# Patient Record
Sex: MALE | Race: OTHER | HISPANIC OR LATINO | Employment: FULL TIME | URBAN - METROPOLITAN AREA
[De-identification: names, ages, dates, MRNs, and addresses within clinical notes are randomized per-mention and may not be internally consistent; named-entity substitution may affect disease eponyms.]

---

## 2021-11-28 ENCOUNTER — OFFICE VISIT (OUTPATIENT)
Dept: URGENT CARE | Facility: CLINIC | Age: 41
End: 2021-11-28
Payer: COMMERCIAL

## 2021-11-28 VITALS
HEART RATE: 110 BPM | DIASTOLIC BLOOD PRESSURE: 84 MMHG | HEIGHT: 69 IN | SYSTOLIC BLOOD PRESSURE: 120 MMHG | OXYGEN SATURATION: 97 % | RESPIRATION RATE: 16 BRPM | TEMPERATURE: 98 F | BODY MASS INDEX: 26.07 KG/M2 | WEIGHT: 176 LBS

## 2021-11-28 DIAGNOSIS — T30.0 BURN: ICD-10-CM

## 2021-11-28 DIAGNOSIS — L03.011 PARONYCHIA OF FINGER OF RIGHT HAND: ICD-10-CM

## 2021-11-28 PROCEDURE — 10060 I&D ABSCESS SIMPLE/SINGLE: CPT | Mod: F5 | Performed by: PHYSICIAN ASSISTANT

## 2021-11-28 RX ORDER — SULFAMETHOXAZOLE AND TRIMETHOPRIM 800; 160 MG/1; MG/1
1 TABLET ORAL EVERY 12 HOURS
Qty: 6 TABLET | Refills: 0 | Status: SHIPPED | OUTPATIENT
Start: 2021-11-28 | End: 2021-12-01

## 2021-11-28 ASSESSMENT — ENCOUNTER SYMPTOMS
FEVER: 0
CHILLS: 0
VOMITING: 0
NAUSEA: 0
BURN: 1

## 2021-11-29 NOTE — PROGRESS NOTES
"  Subjective:     Souleymane Bethea  is a 41 y.o. male who presents for Burn (x1week, from glue gun, now a blister, possible infection.)      Burn  This is a new problem. The current episode started in the past 7 days. Pertinent negatives include no chills, fever, nausea or vomiting.   Patient presents urgent care around 1 week status post injury to right hand thumb.  He states he was using a glue gun and some of the glue landed across his thumb as well as adjacent to his thumbnail.  He has had some redness and progressive pain over the interim.  Notes swelling and pus filled blister at the base of finger nail over the last 2 to 3 days.  Denies fevers chills or whole body symptoms.  Denies redness tracking up arm.  Denies past medical history of similar.  Patient is right-hand dominant.  He did try some topical antibiotic ointment.    Review of Systems   Constitutional: Negative for chills and fever.   Gastrointestinal: Negative for nausea and vomiting.   Musculoskeletal:        Right thumb, swelling pus filled blister       Medications:    • This patient does not have an active medication from one of the medication groupers.    Allergies: Ibuprofen    Problem List: Souleymane Bethea does not have a problem list on file.    Surgical History:  No past surgical history on file.    Past Social Hx: Souleymane Bethea  reports that he has never smoked. He has never used smokeless tobacco.     Past Family Hx:  Souleymane Bethea family history is not on file.     Problem list, medications, and allergies reviewed by myself today in Epic.     Objective:   /84   Pulse (!) 110   Temp 36.7 °C (98 °F) (Temporal)   Resp 16   Ht 1.753 m (5' 9\")   Wt 79.8 kg (176 lb)   SpO2 97%   BMI 25.99 kg/m²     Physical Exam  Vitals and nursing note reviewed.   Constitutional:       General: He is not in acute distress.     Appearance: He is well-developed. He is not diaphoretic.   HENT:      Head: Normocephalic and atraumatic.      " Right Ear: External ear normal.      Left Ear: External ear normal.      Nose: Nose normal.   Eyes:      General: No scleral icterus.        Right eye: No discharge.         Left eye: No discharge.      Conjunctiva/sclera: Conjunctivae normal.   Pulmonary:      Effort: Pulmonary effort is normal. No respiratory distress.   Musculoskeletal:         General: Normal range of motion.        Hands:       Cervical back: Neck supple.      Comments: Right thumb, proximal nail fold, 1 cm diameter, raised, fluctuant, purulent cyst   Skin:     General: Skin is warm and dry.      Coloration: Skin is not pale.   Neurological:      Mental Status: He is alert and oriented to person, place, and time.      Coordination: Coordination normal.       Procedure: I & D  Paronychia     Risks and benefits discussed at length  Anesthesia: Ice held with compression over thumb  Clean technique using sterile instruments  #11 blade was advanced at the nail border until purulent material expressed.   Topical antibiotic and dressing applied.  Patient tolerated the  procedure well.      Assessment/Plan:   Assessment      1. Paronychia of finger of right hand  - sulfamethoxazole-trimethoprim (BACTRIM DS) 800-160 MG tablet; Take 1 Tablet by mouth every 12 hours for 3 days.  Dispense: 6 Tablet; Refill: 0    2. Burn  Supportive care is reviewed with patient/caregiver - recommend to push PO fluids and electrolytes,  take full course of Rx, take with probiotics, observe for resolution   Return to clinic with lack of resolution or progression of symptoms.  Epsom salt soaks    I have worn an N95 mask, gloves and eye protection for the entire encounter with this patient.     Differential diagnosis, natural history, supportive care, and indications for immediate follow-up discussed.

## 2022-03-09 ENCOUNTER — PRE-ADMISSION TESTING (OUTPATIENT)
Dept: ADMISSIONS | Facility: MEDICAL CENTER | Age: 42
End: 2022-03-09
Attending: SURGERY
Payer: COMMERCIAL

## 2022-03-09 DIAGNOSIS — Z01.812 PRE-OPERATIVE LABORATORY EXAMINATION: ICD-10-CM

## 2022-03-09 LAB
SARS-COV-2 RNA RESP QL NAA+PROBE: NOTDETECTED
SPECIMEN SOURCE: NORMAL

## 2022-03-09 PROCEDURE — U0005 INFEC AGEN DETEC AMPLI PROBE: HCPCS

## 2022-03-09 PROCEDURE — U0003 INFECTIOUS AGENT DETECTION BY NUCLEIC ACID (DNA OR RNA); SEVERE ACUTE RESPIRATORY SYNDROME CORONAVIRUS 2 (SARS-COV-2) (CORONAVIRUS DISEASE [COVID-19]), AMPLIFIED PROBE TECHNIQUE, MAKING USE OF HIGH THROUGHPUT TECHNOLOGIES AS DESCRIBED BY CMS-2020-01-R: HCPCS

## 2022-03-09 PROCEDURE — C9803 HOPD COVID-19 SPEC COLLECT: HCPCS

## 2022-03-11 ENCOUNTER — ANESTHESIA EVENT (OUTPATIENT)
Dept: SURGERY | Facility: MEDICAL CENTER | Age: 42
End: 2022-03-11
Payer: COMMERCIAL

## 2022-03-11 ENCOUNTER — ANESTHESIA (OUTPATIENT)
Dept: SURGERY | Facility: MEDICAL CENTER | Age: 42
End: 2022-03-11
Payer: COMMERCIAL

## 2022-03-11 ENCOUNTER — HOSPITAL ENCOUNTER (OUTPATIENT)
Facility: MEDICAL CENTER | Age: 42
End: 2022-03-11
Attending: SURGERY | Admitting: SURGERY
Payer: COMMERCIAL

## 2022-03-11 VITALS
OXYGEN SATURATION: 95 % | HEART RATE: 112 BPM | BODY MASS INDEX: 26.06 KG/M2 | WEIGHT: 175.93 LBS | RESPIRATION RATE: 20 BRPM | TEMPERATURE: 98.1 F | DIASTOLIC BLOOD PRESSURE: 84 MMHG | SYSTOLIC BLOOD PRESSURE: 127 MMHG | HEIGHT: 69 IN

## 2022-03-11 DIAGNOSIS — G89.18 POST-OP PAIN: ICD-10-CM

## 2022-03-11 PROCEDURE — 700102 HCHG RX REV CODE 250 W/ 637 OVERRIDE(OP): Performed by: ANESTHESIOLOGY

## 2022-03-11 PROCEDURE — 501838 HCHG SUTURE GENERAL: Performed by: SURGERY

## 2022-03-11 PROCEDURE — 160036 HCHG PACU - EA ADDL 30 MINS PHASE I: Performed by: SURGERY

## 2022-03-11 PROCEDURE — 700111 HCHG RX REV CODE 636 W/ 250 OVERRIDE (IP): Performed by: ANESTHESIOLOGY

## 2022-03-11 PROCEDURE — 160029 HCHG SURGERY MINUTES - 1ST 30 MINS LEVEL 4: Performed by: SURGERY

## 2022-03-11 PROCEDURE — 160025 RECOVERY II MINUTES (STATS): Performed by: SURGERY

## 2022-03-11 PROCEDURE — 160035 HCHG PACU - 1ST 60 MINS PHASE I: Performed by: SURGERY

## 2022-03-11 PROCEDURE — 160002 HCHG RECOVERY MINUTES (STAT): Performed by: SURGERY

## 2022-03-11 PROCEDURE — 502714 HCHG ROBOTIC SURGERY SERVICES: Performed by: SURGERY

## 2022-03-11 PROCEDURE — 700101 HCHG RX REV CODE 250: Performed by: ANESTHESIOLOGY

## 2022-03-11 PROCEDURE — 501570 HCHG TROCAR, SEPARATOR: Performed by: SURGERY

## 2022-03-11 PROCEDURE — 700105 HCHG RX REV CODE 258: Performed by: SURGERY

## 2022-03-11 PROCEDURE — C1781 MESH (IMPLANTABLE): HCPCS | Performed by: SURGERY

## 2022-03-11 PROCEDURE — A9270 NON-COVERED ITEM OR SERVICE: HCPCS | Performed by: ANESTHESIOLOGY

## 2022-03-11 PROCEDURE — 160009 HCHG ANES TIME/MIN: Performed by: SURGERY

## 2022-03-11 PROCEDURE — 160048 HCHG OR STATISTICAL LEVEL 1-5: Performed by: SURGERY

## 2022-03-11 PROCEDURE — 160041 HCHG SURGERY MINUTES - EA ADDL 1 MIN LEVEL 4: Performed by: SURGERY

## 2022-03-11 PROCEDURE — 700101 HCHG RX REV CODE 250: Performed by: SURGERY

## 2022-03-11 PROCEDURE — 160046 HCHG PACU - 1ST 60 MINS PHASE II: Performed by: SURGERY

## 2022-03-11 DEVICE — MESH PROGRIP LAPROSCOPIC SELF FIXATING (1/CA): Type: IMPLANTABLE DEVICE | Site: INGUINAL | Status: FUNCTIONAL

## 2022-03-11 RX ORDER — HYDROMORPHONE HYDROCHLORIDE 1 MG/ML
0.2 INJECTION, SOLUTION INTRAMUSCULAR; INTRAVENOUS; SUBCUTANEOUS
Status: DISCONTINUED | OUTPATIENT
Start: 2022-03-11 | End: 2022-03-11 | Stop reason: HOSPADM

## 2022-03-11 RX ORDER — ROCURONIUM BROMIDE 10 MG/ML
INJECTION, SOLUTION INTRAVENOUS PRN
Status: DISCONTINUED | OUTPATIENT
Start: 2022-03-11 | End: 2022-03-11 | Stop reason: SURG

## 2022-03-11 RX ORDER — MEPERIDINE HYDROCHLORIDE 25 MG/ML
12.5 INJECTION INTRAMUSCULAR; INTRAVENOUS; SUBCUTANEOUS
Status: DISCONTINUED | OUTPATIENT
Start: 2022-03-11 | End: 2022-03-11 | Stop reason: HOSPADM

## 2022-03-11 RX ORDER — ONDANSETRON 2 MG/ML
INJECTION INTRAMUSCULAR; INTRAVENOUS PRN
Status: DISCONTINUED | OUTPATIENT
Start: 2022-03-11 | End: 2022-03-11 | Stop reason: SURG

## 2022-03-11 RX ORDER — OXYCODONE HCL 5 MG/5 ML
5 SOLUTION, ORAL ORAL
Status: COMPLETED | OUTPATIENT
Start: 2022-03-11 | End: 2022-03-11

## 2022-03-11 RX ORDER — MIDAZOLAM HYDROCHLORIDE 1 MG/ML
INJECTION INTRAMUSCULAR; INTRAVENOUS PRN
Status: DISCONTINUED | OUTPATIENT
Start: 2022-03-11 | End: 2022-03-11 | Stop reason: SURG

## 2022-03-11 RX ORDER — HALOPERIDOL 5 MG/ML
1 INJECTION INTRAMUSCULAR
Status: DISCONTINUED | OUTPATIENT
Start: 2022-03-11 | End: 2022-03-11 | Stop reason: HOSPADM

## 2022-03-11 RX ORDER — SODIUM CHLORIDE, SODIUM LACTATE, POTASSIUM CHLORIDE, CALCIUM CHLORIDE 600; 310; 30; 20 MG/100ML; MG/100ML; MG/100ML; MG/100ML
INJECTION, SOLUTION INTRAVENOUS CONTINUOUS
Status: ACTIVE | OUTPATIENT
Start: 2022-03-11 | End: 2022-03-11

## 2022-03-11 RX ORDER — ONDANSETRON 2 MG/ML
4 INJECTION INTRAMUSCULAR; INTRAVENOUS
Status: DISCONTINUED | OUTPATIENT
Start: 2022-03-11 | End: 2022-03-11 | Stop reason: HOSPADM

## 2022-03-11 RX ORDER — METOPROLOL TARTRATE 1 MG/ML
1 INJECTION, SOLUTION INTRAVENOUS
Status: DISCONTINUED | OUTPATIENT
Start: 2022-03-11 | End: 2022-03-11 | Stop reason: HOSPADM

## 2022-03-11 RX ORDER — OXYCODONE HYDROCHLORIDE 5 MG/1
5 TABLET ORAL EVERY 6 HOURS PRN
Qty: 10 TABLET | Refills: 0 | Status: SHIPPED | OUTPATIENT
Start: 2022-03-11 | End: 2022-03-14

## 2022-03-11 RX ORDER — SODIUM CHLORIDE, SODIUM LACTATE, POTASSIUM CHLORIDE, CALCIUM CHLORIDE 600; 310; 30; 20 MG/100ML; MG/100ML; MG/100ML; MG/100ML
INJECTION, SOLUTION INTRAVENOUS CONTINUOUS
Status: DISCONTINUED | OUTPATIENT
Start: 2022-03-11 | End: 2022-03-11 | Stop reason: HOSPADM

## 2022-03-11 RX ORDER — HYDROMORPHONE HYDROCHLORIDE 1 MG/ML
0.4 INJECTION, SOLUTION INTRAMUSCULAR; INTRAVENOUS; SUBCUTANEOUS
Status: DISCONTINUED | OUTPATIENT
Start: 2022-03-11 | End: 2022-03-11 | Stop reason: HOSPADM

## 2022-03-11 RX ORDER — CEFAZOLIN SODIUM 1 G/3ML
INJECTION, POWDER, FOR SOLUTION INTRAMUSCULAR; INTRAVENOUS PRN
Status: DISCONTINUED | OUTPATIENT
Start: 2022-03-11 | End: 2022-03-11 | Stop reason: SURG

## 2022-03-11 RX ORDER — SUCCINYLCHOLINE CHLORIDE 20 MG/ML
INJECTION INTRAMUSCULAR; INTRAVENOUS PRN
Status: DISCONTINUED | OUTPATIENT
Start: 2022-03-11 | End: 2022-03-11 | Stop reason: SURG

## 2022-03-11 RX ORDER — LABETALOL HYDROCHLORIDE 5 MG/ML
5 INJECTION, SOLUTION INTRAVENOUS
Status: DISCONTINUED | OUTPATIENT
Start: 2022-03-11 | End: 2022-03-11 | Stop reason: HOSPADM

## 2022-03-11 RX ORDER — DIPHENHYDRAMINE HYDROCHLORIDE 50 MG/ML
12.5 INJECTION INTRAMUSCULAR; INTRAVENOUS
Status: DISCONTINUED | OUTPATIENT
Start: 2022-03-11 | End: 2022-03-11 | Stop reason: HOSPADM

## 2022-03-11 RX ORDER — HYDRALAZINE HYDROCHLORIDE 20 MG/ML
5 INJECTION INTRAMUSCULAR; INTRAVENOUS
Status: DISCONTINUED | OUTPATIENT
Start: 2022-03-11 | End: 2022-03-11 | Stop reason: HOSPADM

## 2022-03-11 RX ORDER — LIDOCAINE HYDROCHLORIDE 20 MG/ML
INJECTION, SOLUTION EPIDURAL; INFILTRATION; INTRACAUDAL; PERINEURAL PRN
Status: DISCONTINUED | OUTPATIENT
Start: 2022-03-11 | End: 2022-03-11 | Stop reason: SURG

## 2022-03-11 RX ORDER — HYDROMORPHONE HYDROCHLORIDE 1 MG/ML
0.1 INJECTION, SOLUTION INTRAMUSCULAR; INTRAVENOUS; SUBCUTANEOUS
Status: DISCONTINUED | OUTPATIENT
Start: 2022-03-11 | End: 2022-03-11 | Stop reason: HOSPADM

## 2022-03-11 RX ORDER — BUPIVACAINE HYDROCHLORIDE AND EPINEPHRINE 5; 5 MG/ML; UG/ML
INJECTION, SOLUTION EPIDURAL; INTRACAUDAL; PERINEURAL
Status: DISCONTINUED | OUTPATIENT
Start: 2022-03-11 | End: 2022-03-11 | Stop reason: HOSPADM

## 2022-03-11 RX ORDER — GABAPENTIN 300 MG/1
300 CAPSULE ORAL 3 TIMES DAILY
Qty: 15 CAPSULE | Refills: 0 | Status: SHIPPED | OUTPATIENT
Start: 2022-03-11 | End: 2022-03-16

## 2022-03-11 RX ORDER — OXYCODONE HCL 5 MG/5 ML
10 SOLUTION, ORAL ORAL
Status: COMPLETED | OUTPATIENT
Start: 2022-03-11 | End: 2022-03-11

## 2022-03-11 RX ORDER — DEXAMETHASONE SODIUM PHOSPHATE 4 MG/ML
INJECTION, SOLUTION INTRA-ARTICULAR; INTRALESIONAL; INTRAMUSCULAR; INTRAVENOUS; SOFT TISSUE PRN
Status: DISCONTINUED | OUTPATIENT
Start: 2022-03-11 | End: 2022-03-11 | Stop reason: SURG

## 2022-03-11 RX ORDER — MIDAZOLAM HYDROCHLORIDE 1 MG/ML
1 INJECTION INTRAMUSCULAR; INTRAVENOUS
Status: DISCONTINUED | OUTPATIENT
Start: 2022-03-11 | End: 2022-03-11 | Stop reason: HOSPADM

## 2022-03-11 RX ADMIN — SUGAMMADEX 200 MG: 100 INJECTION, SOLUTION INTRAVENOUS at 12:03

## 2022-03-11 RX ADMIN — LIDOCAINE HYDROCHLORIDE 0.5 ML: 10 INJECTION, SOLUTION EPIDURAL; INFILTRATION; INTRACAUDAL; PERINEURAL at 08:40

## 2022-03-11 RX ADMIN — SODIUM CHLORIDE, POTASSIUM CHLORIDE, SODIUM LACTATE AND CALCIUM CHLORIDE: 600; 310; 30; 20 INJECTION, SOLUTION INTRAVENOUS at 08:39

## 2022-03-11 RX ADMIN — SUCCINYLCHOLINE CHLORIDE 100 MG: 20 INJECTION, SOLUTION INTRAMUSCULAR; INTRAVENOUS; PARENTERAL at 10:59

## 2022-03-11 RX ADMIN — ONDANSETRON 4 MG: 2 INJECTION INTRAMUSCULAR; INTRAVENOUS at 10:59

## 2022-03-11 RX ADMIN — DEXAMETHASONE SODIUM PHOSPHATE 4 MG: 4 INJECTION, SOLUTION INTRA-ARTICULAR; INTRALESIONAL; INTRAMUSCULAR; INTRAVENOUS; SOFT TISSUE at 10:59

## 2022-03-11 RX ADMIN — ROCURONIUM BROMIDE 50 MG: 10 INJECTION, SOLUTION INTRAVENOUS at 10:59

## 2022-03-11 RX ADMIN — CEFAZOLIN 2 G: 330 INJECTION, POWDER, FOR SOLUTION INTRAMUSCULAR; INTRAVENOUS at 10:59

## 2022-03-11 RX ADMIN — OXYCODONE HYDROCHLORIDE 10 MG: 5 SOLUTION ORAL at 12:43

## 2022-03-11 RX ADMIN — FENTANYL CITRATE 100 MCG: 50 INJECTION, SOLUTION INTRAMUSCULAR; INTRAVENOUS at 10:59

## 2022-03-11 RX ADMIN — EPHEDRINE SULFATE 25 MG: 50 INJECTION, SOLUTION INTRAVENOUS at 10:59

## 2022-03-11 RX ADMIN — LIDOCAINE HYDROCHLORIDE 50 MG: 20 INJECTION, SOLUTION EPIDURAL; INFILTRATION; INTRACAUDAL at 10:59

## 2022-03-11 RX ADMIN — PROPOFOL 200 MG: 10 INJECTION, EMULSION INTRAVENOUS at 10:59

## 2022-03-11 RX ADMIN — MIDAZOLAM HYDROCHLORIDE 2 MG: 1 INJECTION, SOLUTION INTRAMUSCULAR; INTRAVENOUS at 10:59

## 2022-03-11 ASSESSMENT — PAIN SCALES - GENERAL: PAIN_LEVEL: 3

## 2022-03-11 NOTE — ANESTHESIA PREPROCEDURE EVALUATION
Case: [id number] Date/Time: 03/11/22 1015    Procedure: REPAIR, HERNIA, INGUINAL, ROBOT-ASSISTED, USING DA KHAI XI - MESH (Right )    Pre-op diagnosis: RIGHT INGUINAL    Location: [facility] OR 11 / SURGERY [facility]    Surgeons: Naty Mendoza M.D.          Relevant Problems   No relevant active problems       Physical Exam    Airway   Mallampati: II  TM distance: >3 FB  Neck ROM: full       Cardiovascular - normal exam  Rhythm: regular  Rate: normal  (-) murmur     Dental - normal exam           Pulmonary - normal exam  Breath sounds clear to auscultation     Abdominal    Neurological - normal exam                 Anesthesia Plan    ASA 2       Plan - general       Airway plan will be ETT          Induction: intravenous    Postoperative Plan: Postoperative administration of opioids is intended.    Pertinent diagnostic labs and testing reviewed    Informed Consent:    Anesthetic plan and risks discussed with patient.    Use of blood products discussed with: patient whom consented to blood products.

## 2022-03-11 NOTE — ANESTHESIA TIME REPORT
Anesthesia Start and Stop Event Times     Date Time Event    3/11/2022 0834 Ready for Procedure     1059 Anesthesia Start     1216 Anesthesia Stop        Responsible Staff  03/11/22    Name Role Begin End    Amadou Daniel M.D. Anesth 1059 1216        Preop Diagnosis (Free Text):  Pre-op Diagnosis     RIGHT INGUINALHERNIA        Preop Diagnosis (Codes):    Premium Reason  Non-Premium    Comments:

## 2022-03-11 NOTE — ANESTHESIA POSTPROCEDURE EVALUATION
Patient: Souleymane Box    Procedure Summary     Date: 03/11/22 Room / Location: [place] [id number] / SURGERY [facility]    Anesthesia Start: 1059 Anesthesia Stop:     Procedure: ROBOT-ASSISTED DAVINCI XI INGUINAL HERNIA REPAIR WITH MESH, RIGHT (Right Abdomen) Diagnosis: (RIGHT INGUINALHERNIA)    Surgeons: Naty Mendoza M.D. Responsible Provider: Amadou Daniel M.D.    Anesthesia Type: general ASA Status: 2          Final Anesthesia Type: general  Last vitals  BP   Blood Pressure: 131/87    Temp   36.4 °C (97.6 °F)    Pulse   (!) 104   Resp   16    SpO2   97 %      Anesthesia Post Evaluation    Patient location during evaluation: PACU  Patient participation: complete - patient participated  Level of consciousness: awake and alert  Pain score: 3    Airway patency: patent  Anesthetic complications: no  Cardiovascular status: hemodynamically stable  Respiratory status: acceptable  Hydration status: euvolemic    PONV: none          No complications documented.

## 2022-03-11 NOTE — OP REPORT
Operative Report    Date: 3/11/2022    Surgeon: Naty Mendoza M.D.     Assistant: Augustine Moreno (MS3)    Pre-operative Diagnosis: right Inguinal Hernia    Post-operative Diagnosis: Same     Procedure: Robotic right Inguinal Hernia Repair with Mesh    Indications: This is a 41 y.o. male who presented with symptoms of right Inguinal Hernia. Here for repair    Findings: Moderate sized right direct inguinal hernia; no evidence of left-sided hernia    Wound Classification: I, Clean..    Procedure in detail: The patient was seen and examined in the preoperative holding area.  The correct side for the surgery was identified with the patient awake and with their input.  This was signed.  The risks benefits and alternatives of the procedure were discussed with the patient who wished to proceed with the procedure as described.  The patient was transferred to the operating room placed in supine position and all pressure points were properly padded.  General endotracheal anesthesia was induced and preoperative antibiotics were given per SCIP protocol.  Patient's abdomen was prepped with ChloraPrep and draped in the normal sterile fashion.  A timeout was performed confirming correct patient, correct procedure, and that all necessary equipment was in the room.      We began the procedure by performing a periumbilical Optiview approach.  The area for our camera port was identified and infused with local anesthetic, local anesthetic was subsequently infused over all port sites.  The skin was sharply incised and the 5 mm port was used to gain access to the abdomen.  Pneumoperitoneum was then achieved and maintained at 15 mmHg carbon dioxide throughout the entirety of the case.  Under direct visualization a right and left working port were placed with the 8 mm robotic port.  We then exchanged our 5 mm port for an 8 mm robotic port. the robot was then docked.  We began by identifying an area approximately 8 cm from the inguinal  hernia and the peritoneum was sharply incised.  Using combination of blunt, electrocautery, and sharp dissection we were able to dissect the peritoneal flap down to the inguinal canal.  Careful dissection was completed medially to identify the pubic symphysis and carried this down to the obturator canal to achieve at least a 2cm space below the pubic symphysis and 2cm across the midline.  The corona mortise was identified and preserved.  We then continued dissection laterally until we encountered the psoas muscle ensuring that we maintain the iliopubic tract and nerves against the abdominal wall.  The dissection was complete when the entire myopectineal orifice could be visualized. A direct hernia sac was identified. Careful dissection was used to free the indirect inguinal hernia sac ensuring that the gonadal structures were carefully identified and preserved throughout the dissection of the hernia sac.  We carried our dissection down until the peritoneum was well below our area of mesh placement. Any identified cord lipoma was carefully dissected free and reduced into the preperitoneal space..     The 15 x 10 pro- hernia mesh was selected and introduced.  This was then laid into the dissected peritoneal flap ensuring good coverage medially down the pubic symphysis over the midline covering the  space as well as good overlap over the inguinal canal anatomy.  The peritoneal flap was manipulated to ensure that the mesh did not move.    The peritoneal flap was then closed with a 2-0 strata fix suture.  The perineal flap was then carefully inspected and any identified rents were closed carefully using the same 2-0 strata fix suture.  The robot was then undocked and the ports were carefully removed.  Skin was then closed with 4-0 Monocryl in a sub-particular fashion and Dermabond was placed over the wounds.    The patient was awakened from general anesthetic, and was taken to the recovery room in stable  condition.    Sponge and needle counts were correct at the end of the case.     Specimen: none    EBL: 10 cc    Dispo: stable, extubated, to PACU    Naty Mendoza M.D.  [facility]  [phone]

## 2022-03-11 NOTE — DISCHARGE INSTRUCTIONS
Robotic/Laparoscopic Surgery Discharge Instructions:    1. DIET: Upon discharge from the hospital you may resume your normal preoperative diet. Depending on how you are feeling and whether you have nausea or not, you may wish to stay with a bland diet for the first few days. However, you can advance your diet as quickly as you feel ready.    2. ACTIVITIES: You have a ten pound weight lifting restriction for four to six weeks after surgery.  This means no lifting anything heavier than a gallon of milk.  Routine activities such as walking and using the stairs are safe.  You may sleep in any position that is comfortable. Avoid strenuous activities and exercise that involves twisting, bending, and, running.    3. DRIVING: You may drive whenever you are off pain medications and are able to perform the activities needed to drive, i.e. turning, bending, twisting, wearing a seat belt, etc.    4. DRESSINGS/BATHING: You may shower two days following your surgery, but do not submerge in a bath or a pool until you after your first postoperative visit.    5. BOWEL FUNCTION: You may develop either frequent or loose stools after meals. This usually corrects itself after a few days, to a few weeks.    You may develop constipation. The combination of pain medication and decreased activity level can cause constipation in otherwise normal patients. If you feel this is occurring, increase your fluid intake and take an over the counter laxative (Milk of Magnesia, Miralax, or Magnesium Citrate).  If this is not successful, take an over the counter Fleet enema and repeat the laxative until effect.    6. PAIN MEDICATION: You may be given a prescription for pain medication at discharge. Please take these as directed. It is important to remember not to take medications on an empty stomach as this may cause nausea.  Wean the use of pain medication as soon as possible.  If narcotics were prescribed, try to avoid their use and try non-narcotic  medications, such as tylenol first.    7. CALL IF YOU HAVE: (1) Fevers of more than 101 F (38.5 C), (2) New chest or leg pain, (3) Worsening abdominal pain, (4) Persistent vomiting, (5) Large quantity bleeding, (6) Drainage from incision that is foul smelling, increased pain at the wound, wound edges that have pulled apart, redness or swelling at the incision site. Please do not hesitate to call with any other questions.     8. FOLLOW-UP APPOINTMENT: Contact my office at [phone] for a follow-up appointment in 1 to 2 weeks following your procedure.    If you have any additional questions, please do not hesitate to call the office and speak to either myself or the physician on call.    Office address:  [address] [place] [address], [address] [place] [address]      Naty Mendoza M.D.   [facility]  General Surgery  Colon and Rectal Surgeon        ACTIVITY: Rest and take it easy for the first 24 hours.  A responsible adult is recommended to remain with you during that time.  It is normal to feel sleepy.  We encourage you to not do anything that requires balance, judgment or coordination.    MILD FLU-LIKE SYMPTOMS ARE NORMAL. YOU MAY EXPERIENCE GENERALIZED MUSCLE ACHES, THROAT IRRITATION, HEADACHE AND/OR SOME NAUSEA.    FOR 24 HOURS DO NOT:  Drive, operate machinery or run household appliances.  Drink beer or alcoholic beverages.   Make important decisions or sign legal documents.    DIET: To avoid nausea, slowly advance diet as tolerated, avoiding spicy or greasy foods for the first day.  Add more substantial food to your diet according to your physician's instructions.  Babies can be fed formula or breast milk as soon as they are hungry.  INCREASE FLUIDS AND FIBER TO AVOID CONSTIPATION.    You should CALL YOUR PHYSICIAN if you develop:  Fever greater than 101 degrees F.  Pain not relieved by medication, or persistent nausea or vomiting.  Excessive bleeding (blood soaking through dressing) or unexpected drainage  from the wound.  Extreme redness or swelling around the incision site, drainage of pus or foul smelling drainage.  Inability to urinate or empty your bladder within 8 hours.  Problems with breathing or chest pain.    You should call 911 if you develop problems with breathing or chest pain.  If you are unable to contact your doctor or surgical center, you should go to the nearest emergency room or urgent care center.  Physician's telephone #: Dr. Mendoza, [phone]    If any questions arise, call your doctor.  If your doctor is not available, please feel free to call the Surgical Center at [phone].     A registered nurse may call you a few days after your surgery to see how you are doing after your procedure.    MEDICATIONS: Resume taking daily medication.  Take prescribed pain medication with food.  If no medication is prescribed, you may take non-aspirin pain medication if needed.  PAIN MEDICATION CAN BE VERY CONSTIPATING.  Take a stool softener or laxative such as senokot, pericolace, or milk of magnesia if needed.    Prescription given for oxycodone, neurontin.  Last pain medication given at 1245.    If your physician has prescribed pain medication that includes Acetaminophen (Tylenol), do not take additional Acetaminophen (Tylenol) while taking the prescribed medication.    Depression / Suicide Risk    As you are discharged from this [facility] facility, it is important to learn how to keep safe from harming yourself.    Recognize the warning signs:  · Abrupt changes in personality, positive or negative- including increase in energy   · Giving away possessions  · Change in eating patterns- significant weight changes-  positive or negative  · Change in sleeping patterns- unable to sleep or sleeping all the time   · Unwillingness or inability to communicate  · Depression  · Unusual sadness, discouragement and loneliness  · Talk of wanting to die  · Neglect of personal appearance   · Rebelliousness-  reckless behavior  · Withdrawal from people/activities they love  · Confusion- inability to concentrate     If you or a loved one observes any of these behaviors or has concerns about self-harm, here's what you can do:  · Talk about it- your feelings and reasons for harming yourself  · Remove any means that you might use to hurt yourself (examples: pills, rope, extension cords, firearm)  · Get professional help from the community (Mental Health, Substance Abuse, psychological counseling)  · Do not be alone:Call your Safe Contact- someone whom you trust who will be there for you.  · Call your local CRISIS HOTLINE 675-6165 or 734-276-4432  · Call your local Children's Mobile Crisis Response Team [place] (174) 065-5535 or www.[employer] Glint  · Call the toll free National Suicide Prevention Hotlines   · National Suicide Prevention Lifeline 663-092-AQRV (2475)  · National Hope Line Network 800-SUICIDE (568-6018)

## 2022-03-11 NOTE — OR NURSING
"Patient received from OR at 1215, bedside report received from anesthesia/OR RN, 2 patient identifiers confirmed . Patient connected to monitors, assessed for general head to toe and pain/nausea. Ordered reviewed and checked. Family updated via telephone on patient status.  At this time patient meets criteria for D/C to phase II/room.  When asked he states pain is \"tolerable\" and he is ready to get up in a chair next door and have family come back. VSS, awake and appropriate, pain minimal or at a tolerable level per patient. Report called to Liliana RN and patient to be taken to phase II.     "

## 2022-03-11 NOTE — ANESTHESIA PROCEDURE NOTES
Airway    Date/Time: 3/11/2022 10:59 AM  Performed by: Amadou Daniel M.D.  Authorized by: Amadou Daniel M.D.     Location:  OR  Urgency:  Elective  Indications for Airway Management:  Anesthesia      Spontaneous Ventilation: absent    Sedation Level:  Deep  Preoxygenated: Yes    Patient Position:  Sniffing  Final Airway Type:  Endotracheal airway  Final Endotracheal Airway:  ETT  Cuffed: Yes    Technique Used for Successful ETT Placement:  Direct laryngoscopy    Insertion Site:  Oral  Blade Type:  Waqar  Laryngoscope Blade/Videolaryngoscope Blade Size:  4  ETT Size (mm):  7.0  Measured from:  Teeth  ETT to Teeth (cm):  20  Placement Verified by: auscultation and capnometry    Cormack-Lehane Classification:  Grade I - full view of glottis  Number of Attempts at Approach:  1

## 2022-09-07 ENCOUNTER — TELEPHONE (OUTPATIENT)
Dept: SCHEDULING | Facility: IMAGING CENTER | Age: 42
End: 2022-09-07

## 2022-09-13 ENCOUNTER — OFFICE VISIT (OUTPATIENT)
Dept: MEDICAL GROUP | Facility: MEDICAL CENTER | Age: 42
End: 2022-09-13
Payer: COMMERCIAL

## 2022-09-13 ENCOUNTER — HOSPITAL ENCOUNTER (OUTPATIENT)
Dept: LAB | Facility: MEDICAL CENTER | Age: 42
End: 2022-09-13
Payer: COMMERCIAL

## 2022-09-13 VITALS
TEMPERATURE: 99.2 F | HEIGHT: 69 IN | BODY MASS INDEX: 26.02 KG/M2 | HEART RATE: 92 BPM | DIASTOLIC BLOOD PRESSURE: 84 MMHG | OXYGEN SATURATION: 96 % | WEIGHT: 175.71 LBS | SYSTOLIC BLOOD PRESSURE: 132 MMHG

## 2022-09-13 DIAGNOSIS — E66.3 OVERWEIGHT (BMI 25.0-29.9): ICD-10-CM

## 2022-09-13 DIAGNOSIS — G56.02 CARPAL TUNNEL SYNDROME OF LEFT WRIST: ICD-10-CM

## 2022-09-13 DIAGNOSIS — K21.9 GASTROESOPHAGEAL REFLUX DISEASE WITHOUT ESOPHAGITIS: ICD-10-CM

## 2022-09-13 DIAGNOSIS — R10.84 GENERALIZED ABDOMINAL PAIN: ICD-10-CM

## 2022-09-13 DIAGNOSIS — R13.10 DYSPHAGIA, UNSPECIFIED TYPE: ICD-10-CM

## 2022-09-13 PROBLEM — N20.0 KIDNEY STONE: Status: ACTIVE | Noted: 2021-12-01

## 2022-09-13 PROBLEM — K46.9 ABDOMINAL HERNIA: Status: ACTIVE | Noted: 2021-12-01

## 2022-09-13 LAB
ALBUMIN SERPL BCP-MCNC: 4.9 G/DL (ref 3.2–4.9)
ALBUMIN/GLOB SERPL: 1.6 G/DL
ALP SERPL-CCNC: 115 U/L (ref 30–99)
ALT SERPL-CCNC: 48 U/L (ref 2–50)
ANION GAP SERPL CALC-SCNC: 13 MMOL/L (ref 7–16)
AST SERPL-CCNC: 31 U/L (ref 12–45)
BILIRUB SERPL-MCNC: 0.4 MG/DL (ref 0.1–1.5)
BUN SERPL-MCNC: 18 MG/DL (ref 8–22)
CALCIUM SERPL-MCNC: 9.2 MG/DL (ref 8.5–10.5)
CHLORIDE SERPL-SCNC: 104 MMOL/L (ref 96–112)
CHOLEST SERPL-MCNC: 190 MG/DL (ref 100–199)
CO2 SERPL-SCNC: 22 MMOL/L (ref 20–33)
CREAT SERPL-MCNC: 1.1 MG/DL (ref 0.5–1.4)
ERYTHROCYTE [DISTWIDTH] IN BLOOD BY AUTOMATED COUNT: 45.5 FL (ref 35.9–50)
GFR SERPLBLD CREATININE-BSD FMLA CKD-EPI: 86 ML/MIN/1.73 M 2
GLOBULIN SER CALC-MCNC: 3 G/DL (ref 1.9–3.5)
GLUCOSE SERPL-MCNC: 95 MG/DL (ref 65–99)
HCT VFR BLD AUTO: 52.5 % (ref 42–52)
HDLC SERPL-MCNC: 42 MG/DL
HGB BLD-MCNC: 17.7 G/DL (ref 14–18)
LDLC SERPL CALC-MCNC: 122 MG/DL
MCH RBC QN AUTO: 29.9 PG (ref 27–33)
MCHC RBC AUTO-ENTMCNC: 33.7 G/DL (ref 33.7–35.3)
MCV RBC AUTO: 88.8 FL (ref 81.4–97.8)
PLATELET # BLD AUTO: 284 K/UL (ref 164–446)
PMV BLD AUTO: 10 FL (ref 9–12.9)
POTASSIUM SERPL-SCNC: 4.2 MMOL/L (ref 3.6–5.5)
PROT SERPL-MCNC: 7.9 G/DL (ref 6–8.2)
RBC # BLD AUTO: 5.91 M/UL (ref 4.7–6.1)
SODIUM SERPL-SCNC: 139 MMOL/L (ref 135–145)
TRIGL SERPL-MCNC: 129 MG/DL (ref 0–149)
TSH SERPL DL<=0.005 MIU/L-ACNC: 1.26 UIU/ML (ref 0.38–5.33)
WBC # BLD AUTO: 6.5 K/UL (ref 4.8–10.8)

## 2022-09-13 PROCEDURE — 84443 ASSAY THYROID STIM HORMONE: CPT

## 2022-09-13 PROCEDURE — 85027 COMPLETE CBC AUTOMATED: CPT

## 2022-09-13 PROCEDURE — 99214 OFFICE O/P EST MOD 30 MIN: CPT

## 2022-09-13 PROCEDURE — 80061 LIPID PANEL: CPT

## 2022-09-13 PROCEDURE — 80053 COMPREHEN METABOLIC PANEL: CPT

## 2022-09-13 PROCEDURE — 36415 COLL VENOUS BLD VENIPUNCTURE: CPT

## 2022-09-13 RX ORDER — OMEPRAZOLE 20 MG/1
20 CAPSULE, DELAYED RELEASE ORAL 2 TIMES DAILY
COMMUNITY
End: 2022-10-31

## 2022-09-13 RX ORDER — GABAPENTIN 100 MG/1
CAPSULE ORAL
COMMUNITY
End: 2022-09-13

## 2022-09-13 ASSESSMENT — PATIENT HEALTH QUESTIONNAIRE - PHQ9: CLINICAL INTERPRETATION OF PHQ2 SCORE: 0

## 2022-09-13 ASSESSMENT — ENCOUNTER SYMPTOMS
ORTHOPNEA: 0
PALPITATIONS: 0
FEVER: 0
CHILLS: 0
COUGH: 0
SHORTNESS OF BREATH: 0

## 2022-09-13 NOTE — PROGRESS NOTES
"Subjective:       HPI:   Souleymane presents today to establish care and discuss mid epigastric pain and left handed numbness and tingling.    Allergies: Ibuprofen     Medications:   Current Outpatient Medications:     omeprazole (PRILOSEC) 20 MG delayed-release capsule, Take 20 mg by mouth 2 times a day., Disp: , Rfl:       ROS:  Review of Systems   Constitutional:  Negative for chills and fever.   Respiratory:  Negative for cough and shortness of breath.    Cardiovascular:  Negative for chest pain, palpitations, orthopnea and leg swelling.     Objective:     Exam:  /84   Pulse 92   Temp 37.3 °C (99.2 °F) (Temporal)   Ht 1.753 m (5' 9\")   Wt 79.7 kg (175 lb 11.3 oz)   SpO2 96%   BMI 25.95 kg/m²  Body mass index is 25.95 kg/m².    Physical Exam  Constitutional:       Appearance: He is normal weight.   HENT:      Right Ear: Tympanic membrane normal.      Left Ear: Tympanic membrane normal.   Eyes:      Pupils: Pupils are equal, round, and reactive to light.   Cardiovascular:      Rate and Rhythm: Normal rate and regular rhythm.      Pulses: Normal pulses.      Heart sounds: Normal heart sounds.   Pulmonary:      Effort: Pulmonary effort is normal.      Breath sounds: Normal breath sounds.   Abdominal:      General: Abdomen is flat. Bowel sounds are normal.      Palpations: Abdomen is soft.      Tenderness: There is abdominal tenderness in the epigastric area and left upper quadrant.   Musculoskeletal:      Comments: Positive tinel's sign, positive Bayron's sign    Neurological:      Mental Status: He is alert and oriented to person, place, and time.   Psychiatric:         Mood and Affect: Mood normal.         Behavior: Behavior normal.       Labs ordered today: CBC, CMP, TSH, Lipid, blood glucose, barium swallow    Assessment & Plan:     42 y.o. male with the following -     1. Generalized abdominal pain  2. Dysphagia, unspecified type  Undiagnosed problem with uncertain prognosis  Patient reports that he has " "been having mid epigastric pain, and \"feels like food is getting stuck or is going to come back up\" since approximately March 2022.  He had had an inguinal hernia repaired at this time and had mentioned it to surgeon, however no further work-up was done at that time.  Patient reports that his pain is worse in the evening time and is intermittent.  - DX-CRISTYOPH. FLUORO (BARIUM SWALLOW); Future    3. Gastroesophageal reflux disease without esophagitis   Chronic, unstable  Patient reports that he has had burning and irritation in the back of his throat in the morning after he wakes up.  He states this has been going on for several months now.  Has not tried over-the-counter medicines.  Discussed with patient picking up omeprazole OTC and trying that for 2 months to see if his symptoms improve.  - omeprazole (PRILOSEC) 20 MG delayed-release capsule; Take 20 mg by mouth 2 times a day.    4. Carpal tunnel syndrome of left wrist  Chronic, unstable  Patient reports intermittent numbness and tingling that has been present for the last few weeks.  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  He has a positive Tinel's sign and positive Phalen sign on the left side.  Discussed options to use a brace, steroid injection, and surgery.  Patient would like to start with a brace and if pain does not improve he will reconsider.    5. Overweight (BMI 25.0-29.9)  Chronic, stable  Dietary and lifestyle modifications discussed with patient.  - CBC WITHOUT DIFFERENTIAL; Future  - Comp Metabolic Panel; Future  - TSH WITH REFLEX TO FT4; Future  - Lipid Profile; Future  - Blood Glucose (fasting); Future    Anticipatory guidance included the following: Patient counseled about skin care, diet, supplements, drugs/alcohol use, safe sex and exercise.     Return in about 3 months (around 12/13/2022), or if symptoms worsen or fail to improve.    Please note that this dictation was created using voice " recognition software. I have made every reasonable attempt to correct obvious errors, but I expect that there are errors of grammar and possibly content that I did not discover before finalizing the note.

## 2022-09-27 ENCOUNTER — HOSPITAL ENCOUNTER (OUTPATIENT)
Dept: RADIOLOGY | Facility: MEDICAL CENTER | Age: 42
End: 2022-09-27
Payer: COMMERCIAL

## 2022-09-27 DIAGNOSIS — R13.10 DYSPHAGIA, UNSPECIFIED TYPE: ICD-10-CM

## 2022-09-27 PROCEDURE — 74220 X-RAY XM ESOPHAGUS 1CNTRST: CPT

## 2022-10-12 ENCOUNTER — OFFICE VISIT (OUTPATIENT)
Dept: MEDICAL GROUP | Facility: MEDICAL CENTER | Age: 42
End: 2022-10-12
Payer: COMMERCIAL

## 2022-10-12 VITALS
WEIGHT: 170.86 LBS | OXYGEN SATURATION: 98 % | DIASTOLIC BLOOD PRESSURE: 60 MMHG | SYSTOLIC BLOOD PRESSURE: 114 MMHG | HEIGHT: 69 IN | BODY MASS INDEX: 25.31 KG/M2 | TEMPERATURE: 97.9 F | HEART RATE: 108 BPM

## 2022-10-12 DIAGNOSIS — R20.2 PARESTHESIA OF LEFT ARM AND LEG: ICD-10-CM

## 2022-10-12 DIAGNOSIS — K21.9 GASTROESOPHAGEAL REFLUX DISEASE WITHOUT ESOPHAGITIS: ICD-10-CM

## 2022-10-12 DIAGNOSIS — Z23 NEED FOR VACCINATION: ICD-10-CM

## 2022-10-12 PROCEDURE — 99214 OFFICE O/P EST MOD 30 MIN: CPT | Mod: 25

## 2022-10-12 PROCEDURE — 90715 TDAP VACCINE 7 YRS/> IM: CPT

## 2022-10-12 PROCEDURE — 90686 IIV4 VACC NO PRSV 0.5 ML IM: CPT

## 2022-10-12 PROCEDURE — 90471 IMMUNIZATION ADMIN: CPT

## 2022-10-12 PROCEDURE — 90472 IMMUNIZATION ADMIN EACH ADD: CPT

## 2022-10-12 ASSESSMENT — ENCOUNTER SYMPTOMS
FEVER: 0
ORTHOPNEA: 0
PALPITATIONS: 0
CHILLS: 0
SHORTNESS OF BREATH: 0
COUGH: 0

## 2022-10-12 ASSESSMENT — FIBROSIS 4 INDEX: FIB4 SCORE: 0.66

## 2022-10-12 NOTE — PROGRESS NOTES
"Subjective:     CC: Tingling (On whole left side of the body, on and off x 1 mth )      HPI:   Souleymane presents today for numbness and tingling in his left hand and leg, heartburn    Allergies: Ibuprofen     Medications:   Current Outpatient Medications:     Cyanocobalamin (B-12 PO), Take  by mouth., Disp: , Rfl:     omeprazole (PRILOSEC) 20 MG delayed-release capsule, Take 20 mg by mouth 2 times a day., Disp: , Rfl:       ROS:  Review of Systems   Constitutional:  Negative for chills and fever.   Respiratory:  Negative for cough and shortness of breath.    Cardiovascular:  Negative for chest pain, palpitations, orthopnea and leg swelling.     Objective:     Exam:  /60 (BP Location: Left arm, Patient Position: Sitting, BP Cuff Size: Adult)   Pulse (!) 108   Temp 36.6 °C (97.9 °F) (Temporal)   Ht 1.753 m (5' 9\")   Wt 77.5 kg (170 lb 13.7 oz)   SpO2 98%   BMI 25.23 kg/m²  Body mass index is 25.23 kg/m².    Physical Exam  Constitutional:       Appearance: He is normal weight.   Eyes:      General: No visual field deficit.     Pupils: Pupils are equal, round, and reactive to light.   Cardiovascular:      Rate and Rhythm: Normal rate and regular rhythm.      Pulses: Normal pulses.      Heart sounds: Normal heart sounds.   Pulmonary:      Effort: Pulmonary effort is normal.      Breath sounds: Normal breath sounds.   Neurological:      Mental Status: He is alert and oriented to person, place, and time.      Cranial Nerves: No facial asymmetry.      Sensory: No sensory deficit.      Motor: No weakness, tremor or atrophy.   Psychiatric:         Mood and Affect: Mood normal.         Behavior: Behavior normal.       Labs/ imaging ordered today: MRI brain with and without contrast, sed rate, T.pallidum, vitamin B12,  Assessment & Plan:     42 y.o. male with the following -     1. Paresthesia of left arm and leg  Undiagnosed problem with on certain prognosis  Patient reports that he has been having numbness on the " left side in his hand and leg.  He states that the numbness and tingling has been there for over a month.  He states that the numbness and tingling is progressively getting worse.  At first the numbness was intermittent and now he has noticed that it is constant.  He also reports that he noticed numbness on the back of his head more recently, in the last week.  He denies any trauma, neck pain, back pain.  Neuro exam was equal bilaterally today in office.  Patient did not show signs of having a stroke.  ER precautions discussed with patient should his symptoms progress quickly before he is able to follow-up with neurology and get imaging done.  - Referral to Neurodiagnostics (EEG,EP,EMG/NCS/DBS)  - Referral to Neurology  - VITAMIN B12; Future  - Sed Rate; Future  - T.PALLIDUM AB TOD (SCREENING); Future  - MR-BRAIN-WITH & W/O; Future  - VITAMIN D,25 HYDROXY (DEFICIENCY); Future  - CRP QUANTITIVE (NON-CARDIAC); Future    2.  Gastroesophageal reflux disease without esophagitis  Chronic, stable  Patient was having heartburn for the last several months.  Since starting omeprazole he has noticed a significant improvement in his symptoms.  Instructed the patient to continue this treatment for 1 more month and then stop the medication to see if his symptoms return.  If his acid reflux returns, then I will refer him to GI for upper endoscopy.    3. Need for vaccination  - INFLUENZA VACCINE QUAD INJ (PF)  - Tdap Vaccine =>6YO IM    Anticipatory guidance included the following: Patient counseled about skin care, diet, supplements, smoking, drugs/alcohol use, safe sex and exercise.     Return in about 2 months (around 12/12/2022).    Please note that this dictation was created using voice recognition software. I have made every reasonable attempt to correct obvious errors, but I expect that there are errors of grammar and possibly content that I did not discover before finalizing the note.

## 2022-10-20 ENCOUNTER — HOSPITAL ENCOUNTER (OUTPATIENT)
Dept: LAB | Facility: MEDICAL CENTER | Age: 42
End: 2022-10-20
Payer: COMMERCIAL

## 2022-10-20 DIAGNOSIS — R20.2 PARESTHESIA OF LEFT ARM AND LEG: ICD-10-CM

## 2022-10-20 LAB
25(OH)D3 SERPL-MCNC: 18 NG/ML (ref 30–100)
CRP SERPL HS-MCNC: <0.3 MG/DL (ref 0–0.75)
ERYTHROCYTE [SEDIMENTATION RATE] IN BLOOD BY WESTERGREN METHOD: 3 MM/HOUR (ref 0–20)
T PALLIDUM AB SER QL IA: NORMAL
VIT B12 SERPL-MCNC: 1218 PG/ML (ref 211–911)

## 2022-10-20 PROCEDURE — 82306 VITAMIN D 25 HYDROXY: CPT

## 2022-10-20 PROCEDURE — 86140 C-REACTIVE PROTEIN: CPT

## 2022-10-20 PROCEDURE — 82607 VITAMIN B-12: CPT

## 2022-10-20 PROCEDURE — 36415 COLL VENOUS BLD VENIPUNCTURE: CPT

## 2022-10-20 PROCEDURE — 86780 TREPONEMA PALLIDUM: CPT

## 2022-10-20 PROCEDURE — 85652 RBC SED RATE AUTOMATED: CPT

## 2022-10-31 ENCOUNTER — OFFICE VISIT (OUTPATIENT)
Dept: URGENT CARE | Facility: CLINIC | Age: 42
End: 2022-10-31
Payer: COMMERCIAL

## 2022-10-31 VITALS
HEART RATE: 96 BPM | BODY MASS INDEX: 25.48 KG/M2 | RESPIRATION RATE: 16 BRPM | SYSTOLIC BLOOD PRESSURE: 144 MMHG | DIASTOLIC BLOOD PRESSURE: 98 MMHG | OXYGEN SATURATION: 98 % | TEMPERATURE: 98.6 F | HEIGHT: 69 IN | WEIGHT: 172 LBS

## 2022-10-31 DIAGNOSIS — R51.9 ACUTE NONINTRACTABLE HEADACHE, UNSPECIFIED HEADACHE TYPE: ICD-10-CM

## 2022-10-31 DIAGNOSIS — R20.2 PARESTHESIA: ICD-10-CM

## 2022-10-31 PROCEDURE — 99214 OFFICE O/P EST MOD 30 MIN: CPT | Performed by: NURSE PRACTITIONER

## 2022-10-31 ASSESSMENT — ENCOUNTER SYMPTOMS
VOMITING: 0
FOCAL WEAKNESS: 0
HEADACHES: 1
WEAKNESS: 0
BLURRED VISION: 0
DIZZINESS: 0
TINGLING: 1
NAUSEA: 0
CONSTITUTIONAL NEGATIVE: 1

## 2022-10-31 ASSESSMENT — VISUAL ACUITY: OU: 1

## 2022-10-31 ASSESSMENT — FIBROSIS 4 INDEX: FIB4 SCORE: 0.66

## 2022-11-01 NOTE — PROGRESS NOTES
"Subjective:     Souleymane Box is a 42 y.o. male who presents for Headache (Headache off and on x 2 days, sharp pain L top of head, tingling L side of body 1 month ago, EMG this week, MRI 2 weeks)       Headache  Providers seen:  None    Yesterday, started to experience new onset of moderate headache at the top of his head followed by paresthesia.    Seen by PCP 10/12/2022. Had CC of left body paresthesia x 1 month. EMG and MRI brain scheduled. Referred to neurology. Labs ordered.    Denies weakness.  No vision change, nausea, or vomiting.    Review of Systems   Constitutional: Negative.    Eyes:  Negative for blurred vision.   Gastrointestinal:  Negative for nausea and vomiting.   Neurological:  Positive for tingling and headaches. Negative for dizziness, focal weakness and weakness.   All other systems reviewed and are negative.    Refer to HPI for additional details.    During this visit, appropriate PPE was worn, hand hygiene was performed, and the patient and any visitors were masked.    PMH:  has a past medical history of Heart burn, Inguinal hernia, and Kidney stone.    MEDS: No current outpatient medications on file.    ALLERGIES:   Allergies   Allergen Reactions    Ibuprofen Rash     Pt stated      SURGHX:   Past Surgical History:   Procedure Laterality Date    [place] LAP,INGUINAL HERNIA REPR,INITIAL Right 3/11/2022    Procedure: ROBOT-ASSISTED DAVINCI XI INGUINAL HERNIA REPAIR WITH MESH, RIGHT;  Surgeon: Naty Mendoza M.D.;  Location: SURGERY [facility];  Service: Gen Robotic    OTHER      tooth extractions     SOCHX:  reports that he has never smoked. He has never used smokeless tobacco. He reports current alcohol use. He reports that he does not use drugs.    FH: Per HPI as applicable/pertinent.      Objective:     BP (!) 144/98   Pulse 96   Temp 37 °C (98.6 °F) (Temporal)   Resp 16   Ht 1.753 m (5' 9\")   Wt 78 kg (172 lb)   SpO2 98%   BMI 25.40 kg/m²     Physical Exam  Nursing note " reviewed.   Constitutional:       General: He is not in acute distress.     Appearance: He is well-developed. He is not ill-appearing or toxic-appearing.   HENT:      Head: Normocephalic.   Eyes:      General: Vision grossly intact.      Extraocular Movements: Extraocular movements intact.      Pupils: Pupils are equal, round, and reactive to light.   Cardiovascular:      Rate and Rhythm: Normal rate.   Pulmonary:      Effort: Pulmonary effort is normal. No respiratory distress.   Musculoskeletal:         General: No deformity. Normal range of motion.   Skin:     General: Skin is warm and dry.      Coloration: Skin is not pale.   Neurological:      Mental Status: He is alert and oriented to person, place, and time.      GCS: GCS eye subscore is 4. GCS verbal subscore is 5. GCS motor subscore is 6.      Cranial Nerves: No dysarthria or facial asymmetry.      Motor: No weakness.      Gait: Gait normal.      Comments: Hand  strong/equal, legs equal, facial movements symmetrical   Psychiatric:         Mood and Affect: Mood normal.         Behavior: Behavior normal. Behavior is cooperative.         Thought Content: Thought content normal.         Judgment: Judgment normal.       Assessment/Plan:     1. Acute nonintractable headache, unspecified headache type  - CT-HEAD W/O; Future    2. Paresthesia  - CT-HEAD W/O; Future    CT pending to further evaluate symptoms.  Patient will schedule with [facility] as he has [employer].    May take OTC Tylenol as needed for headache.    Patient will proceed with scheduled EMG and MRI of the brain. He will follow up with neurology.    Warning signs reviewed. Strict return/ER precautions advised.    Discharge summary provided.     Differential diagnosis, natural history, supportive care, over-the-counter symptom management per 's instructions, close monitoring, and indications for immediate follow-up discussed.     All questions answered. Patient agrees with the plan of care.

## 2022-11-02 ENCOUNTER — NON-PROVIDER VISIT (OUTPATIENT)
Dept: NEUROLOGY | Facility: MEDICAL CENTER | Age: 42
End: 2022-11-02
Attending: PSYCHIATRY & NEUROLOGY
Payer: COMMERCIAL

## 2022-11-02 DIAGNOSIS — R20.2 PARESTHESIA OF LEFT ARM AND LEG: ICD-10-CM

## 2022-11-02 PROCEDURE — 95886 MUSC TEST DONE W/N TEST COMP: CPT | Performed by: PSYCHIATRY & NEUROLOGY

## 2022-11-02 PROCEDURE — 95912 NRV CNDJ TEST 11-12 STUDIES: CPT | Mod: 26 | Performed by: PSYCHIATRY & NEUROLOGY

## 2022-11-02 PROCEDURE — 95886 MUSC TEST DONE W/N TEST COMP: CPT | Mod: 26 | Performed by: PSYCHIATRY & NEUROLOGY

## 2022-11-02 PROCEDURE — 95912 NRV CNDJ TEST 11-12 STUDIES: CPT | Performed by: PSYCHIATRY & NEUROLOGY

## 2022-11-08 ENCOUNTER — HOSPITAL ENCOUNTER (EMERGENCY)
Facility: MEDICAL CENTER | Age: 42
End: 2022-11-08
Attending: EMERGENCY MEDICINE
Payer: COMMERCIAL

## 2022-11-08 VITALS
DIASTOLIC BLOOD PRESSURE: 100 MMHG | BODY MASS INDEX: 25.7 KG/M2 | HEART RATE: 98 BPM | RESPIRATION RATE: 16 BRPM | TEMPERATURE: 98.1 F | SYSTOLIC BLOOD PRESSURE: 142 MMHG | HEIGHT: 69 IN | WEIGHT: 173.5 LBS | OXYGEN SATURATION: 99 %

## 2022-11-08 DIAGNOSIS — R20.2 PARESTHESIAS: ICD-10-CM

## 2022-11-08 LAB — GLUCOSE BLD STRIP.AUTO-MCNC: 103 MG/DL (ref 65–99)

## 2022-11-08 PROCEDURE — 99282 EMERGENCY DEPT VISIT SF MDM: CPT

## 2022-11-08 PROCEDURE — 82962 GLUCOSE BLOOD TEST: CPT

## 2022-11-08 RX ORDER — ACETAMINOPHEN 500 MG
500 TABLET ORAL EVERY 6 HOURS PRN
Status: SHIPPED | COMMUNITY
End: 2022-12-15

## 2022-11-08 RX ORDER — OMEPRAZOLE 20 MG/1
20 CAPSULE, DELAYED RELEASE ORAL PRN
Status: SHIPPED | COMMUNITY
End: 2022-12-15 | Stop reason: SDUPTHER

## 2022-11-08 RX ORDER — GABAPENTIN 300 MG/1
300 CAPSULE ORAL 3 TIMES DAILY
Qty: 90 CAPSULE | Refills: 0 | Status: SHIPPED | OUTPATIENT
Start: 2022-11-08 | End: 2022-11-21 | Stop reason: SDUPTHER

## 2022-11-08 ASSESSMENT — FIBROSIS 4 INDEX: FIB4 SCORE: 0.66

## 2022-11-09 NOTE — ED NOTES
Med rec updated and complete, per pt   Allergies reviewed, per pt   Pt reports no prescription medications or vitamins.  Pt reports no antibiotics in the last 30 days.

## 2022-11-09 NOTE — ED PROVIDER NOTES
ED Provider Note    CHIEF COMPLAINT  Chief Complaint   Patient presents with    Headache     Left side of head feels pressure and burning sensation, this has been happening over the past month.  Episodes last anywhere between 2-15 minutes and go away. Reports feeling mental fogginess during episode.  No n/v/dizziness/chest pain/shortness of breath.  He reports tingling down left leg.  No trauma to area.  NIH 0        HPI  Souleymane Box is a 42 y.o. male who presents to the ED with complaints of left-sided tingling burning of his head.  Patient states that over the past 2 to 3 months has been having issues with his left side primary is left leg left arm and head having bouts of tingling.  Patient has been seen by his primary care physician he is set up to have a CT scan as well as an MRI next week CT is on Monday MRI is on Tuesday the patient has been seen for a neurologist because he has been having some tingling to his left leg and had an EMG study that was told to him as being normal.  Patient states that today he had an episode where started having a tingling to the left side of his head then it started getting a burning sensation on the top of his head so he was concerned and presented to the emergency department for evaluation.  Upon arrival here he relates the above history states the burning is resolved he does describe some tingling down his left leg.  Patient denies any other symptoms presents for evaluation.    REVIEW OF SYSTEMS  See HPI for further details. All other systems are negative.     PAST MEDICAL HISTORY  Past Medical History:   Diagnosis Date    Heart burn     Inguinal hernia     Kidney stone        FAMILY HISTORY  Family History   Problem Relation Age of Onset    No Known Problems Mother     No Known Problems Father     No Known Problems Sister     No Known Problems Brother     No Known Problems Brother        SOCIAL HISTORY  Social History     Socioeconomic History    Marital status:  "Single   Tobacco Use    Smoking status: Never    Smokeless tobacco: Never   Vaping Use    Vaping Use: Never used   Substance and Sexual Activity    Alcohol use: Yes     Comment: 1-2 per month    Drug use: Never    Sexual activity: Not Currently          SURGICAL HISTORY  Past Surgical History:   Procedure Laterality Date    [place] LAP,INGUINAL HERNIA REPR,INITIAL Right 3/11/2022    Procedure: ROBOT-ASSISTED DAVINCI XI INGUINAL HERNIA REPAIR WITH MESH, RIGHT;  Surgeon: Naty Mendoza M.D.;  Location: SURGERY [facility];  Service: Gen Robotic    OTHER      tooth extractions       CURRENT MEDICATIONS  Home Medications       Reviewed by Julio Cesar Beltran (Pharmacy Tech) on 11/08/22 at 1645  Med List Status: Complete     Medication Last Dose Status   acetaminophen (TYLENOL) 500 MG Tab 11/6/2022 Active   omeprazole (PRILOSEC) 20 MG delayed-release capsule > 10 days Active                    ALLERGIES  Allergies   Allergen Reactions    Ibuprofen Rash     .       PHYSICAL EXAM  VITAL SIGNS: BP (!) 147/112   Pulse (!) 101   Temp 36.7 °C (98 °F) (Temporal)   Resp 18   Ht 1.753 m (5' 9\")   Wt 78.7 kg (173 lb 8 oz)   SpO2 98%   BMI 25.62 kg/m²    Pulse Oximetry was obtained. It showed a reading of Pulse Oximetry: 98 %.  I interpreted this as nonhypoxic.     Constitutional: Well developed, Well nourished, No acute distress, Non-toxic appearance.   HENT: Normocephalic, Atraumatic, Bilateral external ears normal, bilateral tympanic membranes normal, Oropharynx moist mucous membranes, No oral exudates, Nose normal.   Eyes:  conjunctiva is normal, there are no signs of exudate.   Neck: Supple, no cervical lymphadenopathy, no meningeal signs..   Lymphatic: No lymphadenopathy noted.   Cardiovascular: Regular rate and rhythm without murmurs gallops or rubs.   Thorax & Lungs: Lungs are clear to auscultation bilaterally, there are no wheezes no rales. Chest wall is nontender.  Abdomen: Soft, nontender nondistended. Bowel " sounds are present.   Skin: Warm, Dry, No erythema,   Back: No tenderness, No CVA tenderness.  Musculoskeletal: Good range of motion in all major joints. No tenderness to palpation or major deformities noted. Intact distal pulses, no clubbing, no cyanosis, no edema     Neurologic: Alert & oriented x 3, Normal motor function,   Cranial nerves II through XII are grossly intact patient is intact to soft touch pinprick to the left and right side throughout.  He describes a tingling or hypersensitivity to his left lower extremity.  Patient has no significant findings on exam NIH is 0  Psychiatric: Affect normal, Judgment normal, Mood normal.       RADIOLOGY/PROCEDURES  No orders to display       Results for orders placed or performed during the hospital encounter of 10/20/22   CRP QUANTITIVE (NON-CARDIAC)   Result Value Ref Range    Stat C-Reactive Protein <0.30 0.00 - 0.75 mg/dL   VITAMIN D,25 HYDROXY (DEFICIENCY)   Result Value Ref Range    25-Hydroxy   Vitamin D 25 18 (L) 30 - 100 ng/mL   T.PALLIDUM AB TOD (SCREENING)   Result Value Ref Range    Syphilis, Treponemal Qual Non-Reactive Non-Reactive   Sed Rate   Result Value Ref Range    Sed Rate Westergren 3 0 - 20 mm/hour   VITAMIN B12   Result Value Ref Range    Vitamin B12 -True Cobalamin 1218 (H) 211 - 911 pg/mL         COURSE & MEDICAL DECISION MAKING  Pertinent Labs & Imaging studies reviewed. (See chart for details)  Patient had normal laboratory studies except for an elevated alkaline phosphatase in September had the above laboratory studies done in October.  He was complaining of some increased thirst so I did do a fingerstick blood sugar here 103.  At this point the patient is set up for an MRI on Tuesday he does not appear to have diabetes and based on his symptomatology they do sound like paresthesias so we will start the patient empirically on Neurontin just to help him with the symptomatology as he is working up the problem as an outpatient.  I do not feel  an emergent imaging study is necessary tonight because there is no significant findings clinically.'s recommended to follow-up and continue with his outpatient work-up as described above.    FINAL IMPRESSION  1. Paresthesias  gabapentin (NEURONTIN) 300 MG Cap             The patient will return for new or worsening symptoms and is stable at the time of discharge.    The patient is referred to a primary physician for blood pressure management, diabetic screening, and for all other preventative health concerns.      DISPOSITION:  Patient will be discharged home in stable condition.    FOLLOW UP:  Yenny Yeboah A.P.R.NMeghana  [address] [place] [id number]  [phone]    Schedule an appointment as soon as possible for a visit   For re-check, Return if any symptoms worsen. Keep your MRI appointment on Tuesday.      OUTPATIENT MEDICATIONS:  New Prescriptions    GABAPENTIN (NEURONTIN) 300 MG CAP    Take 1 Capsule by mouth 3 times a day.               Electronically signed by: Justin Mendoza M.D., 11/8/2022 5:12 PM

## 2022-11-09 NOTE — ED TRIAGE NOTES
"Chief Complaint   Patient presents with    Headache     Left side of head feels pressure and burning sensation, this has been happening over the past month.  Episodes last anywhere between 2-15 minutes and go away. Reports feeling mental fogginess during episode.  No n/v/dizziness/chest pain/shortness of breath.  He reports tingling down left leg.  No trauma to area.  NIH 0     BP (!) 147/112   Pulse (!) 101   Temp 36.7 °C (98 °F) (Temporal)   Resp 18   Ht 1.753 m (5' 9\")   Wt 78.7 kg (173 lb 8 oz)   SpO2 98%   BMI 25.62 kg/m²     "

## 2022-11-09 NOTE — ED NOTES
Patient verbalized understanding to plan of care and discharge information. Patient in stable condition. Patient ambulated out of ED to person vehicle with stable gait.

## 2022-11-21 DIAGNOSIS — J34.1 MUCOUS RETENTION CYST OF MAXILLARY SINUS: ICD-10-CM

## 2022-11-21 DIAGNOSIS — R20.2 PARESTHESIAS: ICD-10-CM

## 2022-11-21 RX ORDER — GABAPENTIN 100 MG/1
100 CAPSULE ORAL 3 TIMES DAILY
Qty: 90 CAPSULE | Refills: 11 | Status: SHIPPED | OUTPATIENT
Start: 2022-11-21 | End: 2023-11-21

## 2022-12-15 ENCOUNTER — OFFICE VISIT (OUTPATIENT)
Dept: MEDICAL GROUP | Facility: MEDICAL CENTER | Age: 42
End: 2022-12-15
Payer: COMMERCIAL

## 2022-12-15 VITALS
OXYGEN SATURATION: 97 % | SYSTOLIC BLOOD PRESSURE: 124 MMHG | TEMPERATURE: 97.4 F | RESPIRATION RATE: 16 BRPM | WEIGHT: 171 LBS | BODY MASS INDEX: 25.33 KG/M2 | HEIGHT: 69 IN | DIASTOLIC BLOOD PRESSURE: 72 MMHG | HEART RATE: 102 BPM

## 2022-12-15 DIAGNOSIS — E55.9 VITAMIN D DEFICIENCY: ICD-10-CM

## 2022-12-15 DIAGNOSIS — G35 MULTIPLE SCLEROSIS (HCC): ICD-10-CM

## 2022-12-15 DIAGNOSIS — R20.2 PARESTHESIA OF LEFT ARM AND LEG: ICD-10-CM

## 2022-12-15 DIAGNOSIS — Z13.29 SCREENING FOR ENDOCRINE, METABOLIC AND IMMUNITY DISORDER: ICD-10-CM

## 2022-12-15 DIAGNOSIS — Z13.228 SCREENING FOR ENDOCRINE, METABOLIC AND IMMUNITY DISORDER: ICD-10-CM

## 2022-12-15 DIAGNOSIS — Z11.59 NEED FOR HEPATITIS C SCREENING TEST: ICD-10-CM

## 2022-12-15 DIAGNOSIS — Z13.0 SCREENING FOR ENDOCRINE, METABOLIC AND IMMUNITY DISORDER: ICD-10-CM

## 2022-12-15 DIAGNOSIS — K21.9 GASTROESOPHAGEAL REFLUX DISEASE WITHOUT ESOPHAGITIS: ICD-10-CM

## 2022-12-15 PROCEDURE — 99214 OFFICE O/P EST MOD 30 MIN: CPT

## 2022-12-15 RX ORDER — VITAMIN B COMPLEX
5000 TABLET ORAL DAILY
COMMUNITY

## 2022-12-15 RX ORDER — OMEPRAZOLE 20 MG/1
20 CAPSULE, DELAYED RELEASE ORAL DAILY
Qty: 30 CAPSULE | Refills: 11 | Status: SHIPPED | OUTPATIENT
Start: 2022-12-15 | End: 2023-12-15

## 2022-12-15 ASSESSMENT — ENCOUNTER SYMPTOMS
FEVER: 0
PALPITATIONS: 0
CHILLS: 0
COUGH: 0
SHORTNESS OF BREATH: 0
ORTHOPNEA: 0

## 2022-12-15 ASSESSMENT — FIBROSIS 4 INDEX: FIB4 SCORE: 0.66

## 2022-12-15 NOTE — PROGRESS NOTES
"Subjective:     CC: Follow-Up (3 month)      HPI:   Souleymane presents today for     Multiple Sclerosis/ Numbness: He is still having numbness and tingling in his hands and feet. He denies any new symptoms.  We did trial him on gabapentin 300 mg 3 times daily, this was too much for him and decrease the dosage to 100 mg 3 times daily.  Patient states that he still is adjusting to this medication, and has only been on it for about 3 weeks.  Discussed with him that he may decrease the dosage to twice a day if this is sufficient in controlling his symptoms.  He is scheduled with neurology, Dr. Cortez, in February.  His recent MRI, which is scanned into the media tab since it was done at [facility], showed a lesion suggestive of multiple sclerosis.    GERD: Prilosec helps, stopped it for 2 weeks and had return of his symptoms.     Allergies: Ibuprofen     Medications:   Current Outpatient Medications:     omeprazole (PRILOSEC) 20 MG delayed-release capsule, Take 1 Capsule by mouth every day. Indications: Heartburn, Disp: 30 Capsule, Rfl: 11    vitamin D3 (CHOLECALCIFEROL) 1000 Unit (25 mcg) Tab, Take 5,000 Units by mouth every day., Disp: , Rfl:     gabapentin (NEURONTIN) 100 MG Cap, Take 1 Capsule by mouth 3 times a day., Disp: 90 Capsule, Rfl: 11      ROS:  Review of Systems   Constitutional:  Negative for chills and fever.   Respiratory:  Negative for cough and shortness of breath.    Cardiovascular:  Negative for chest pain, palpitations, orthopnea and leg swelling.     Objective:     Exam:  /72 (BP Location: Left arm, Patient Position: Sitting, BP Cuff Size: Adult)   Pulse (!) 102   Temp 36.3 °C (97.4 °F) (Temporal)   Resp 16   Ht 1.753 m (5' 9\")   Wt 77.6 kg (171 lb)   SpO2 97%   BMI 25.25 kg/m²  Body mass index is 25.25 kg/m².    Physical Exam  Constitutional:       Appearance: He is normal weight.   Eyes:      Pupils: Pupils are equal, round, and reactive to light.   Cardiovascular:      Rate and " Rhythm: Normal rate and regular rhythm.      Pulses: Normal pulses.      Heart sounds: Normal heart sounds.   Pulmonary:      Effort: Pulmonary effort is normal.      Breath sounds: Normal breath sounds.   Neurological:      Mental Status: He is alert and oriented to person, place, and time.   Psychiatric:         Mood and Affect: Mood normal.         Behavior: Behavior normal.       Assessment & Plan:     42 y.o. male with the following -     1. Multiple sclerosis (HCC)  2. Paresthesia of left arm and leg  Chronic, stable  -Follow-up with neurology in February  -Continue gabapentin    3. Gastroesophageal reflux disease without esophagitis  Chronic, stable  - omeprazole (PRILOSEC) 20 MG delayed-release capsule; Take 1 Capsule by mouth every day. Indications: Heartburn  Dispense: 30 Capsule; Refill: 11    4. Vitamin D deficiency  - VITAMIN D,25 HYDROXY (DEFICIENCY); Future  - vitamin D3 (CHOLECALCIFEROL) 1000 Unit (25 mcg) Tab, Take 5,000 Units by mouth every day., Disp: , Rfl:   5. Screening for endocrine, metabolic and immunity disorder  - TSH WITH REFLEX TO FT4; Future  - Comp Metabolic Panel; Future  - CBC WITHOUT DIFFERENTIAL; Future    6. Need for hepatitis C screening test  - HEP C VIRUS ANTIBODY; Future      Anticipatory guidance included the following: Patient counseled about skin care, diet, supplements, smoking, drugs/alcohol use, safe sex and exercise.     Return in about 2 months (around 2/25/2023).    Please note that this dictation was created using voice recognition software. I have made every reasonable attempt to correct obvious errors, but I expect that there are errors of grammar and possibly content that I did not discover before finalizing the note.

## 2023-02-10 ENCOUNTER — TELEPHONE (OUTPATIENT)
Dept: NEUROLOGY | Facility: MEDICAL CENTER | Age: 43
End: 2023-02-10
Payer: COMMERCIAL

## 2023-02-15 ENCOUNTER — OFFICE VISIT (OUTPATIENT)
Dept: NEUROLOGY | Facility: MEDICAL CENTER | Age: 43
End: 2023-02-15
Attending: PSYCHIATRY & NEUROLOGY
Payer: COMMERCIAL

## 2023-02-15 VITALS
HEART RATE: 107 BPM | WEIGHT: 178.79 LBS | SYSTOLIC BLOOD PRESSURE: 124 MMHG | TEMPERATURE: 97.5 F | OXYGEN SATURATION: 98 % | DIASTOLIC BLOOD PRESSURE: 78 MMHG | HEIGHT: 69 IN | RESPIRATION RATE: 17 BRPM | BODY MASS INDEX: 26.48 KG/M2

## 2023-02-15 DIAGNOSIS — R20.2 PARESTHESIA OF LEFT ARM AND LEG: Primary | ICD-10-CM

## 2023-02-15 PROCEDURE — 99205 OFFICE O/P NEW HI 60 MIN: CPT | Performed by: PSYCHIATRY & NEUROLOGY

## 2023-02-15 PROCEDURE — 99211 OFF/OP EST MAY X REQ PHY/QHP: CPT | Performed by: PSYCHIATRY & NEUROLOGY

## 2023-02-15 ASSESSMENT — FIBROSIS 4 INDEX: FIB4 SCORE: 0.66

## 2023-02-15 ASSESSMENT — PATIENT HEALTH QUESTIONNAIRE - PHQ9: CLINICAL INTERPRETATION OF PHQ2 SCORE: 0

## 2023-02-15 NOTE — PROGRESS NOTES
"[facility] NEUROLOGY  GENERAL NEUROLOGY  NEW PATIENT VISIT    Referral source: ELIZABETH Rodriguez    CC: \"paresthesia of left arm and leg\"    HISTORY OF ILLNESS:  Souleymane Box is a 42 y.o. man with a history most notable for GERD, and kidney stones.  Today, he was unaccompanied, and he provided the following history:    Souleymane developed numbness involving the lower extremities.    9/2022:  Souleymane developed numbness and tingling involving the upper and later lower extremities.  The hands were involved first.  About an hour later the feet became involved.  There was both \"tingling\" and numbness.  At first the symptoms were intermittent.  There were no associated injuries.    For about two months there were episodes of sensory symptoms involving the hands and the feet.    He consulted with his PCP.  He took gabapentin 300 mg, but this was associated with intolerable drowsiness.  Later, he reduced the dosage to 100 mg TID.  This helps with his symptoms.    10/2022:  Souleymane underwent MRI of the brain.    11-12/2022:  Souleymane feels \"pain\" involving the lower extremities.  There is a \"sharp\" pain which localizes to the anterior legs (distal to the knees).  At times his legs feel \"cold.\"  This comes and goes.  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  Moving alleviates his symptoms somewhat.  Cold temperatures seem to worsen the pain.  Lately, the pain occurs ~2 times/week and lasts ~30 minutes at a time.    MEDICAL AND SURGICAL HISTORY:  Past Medical History:   Diagnosis Date    Heart burn     Inguinal hernia     Kidney stone      Past Surgical History:   Procedure Laterality Date    GA LAP,INGUINAL HERNIA REPR,INITIAL Right 3/11/2022    Procedure: ROBOT-ASSISTED DAVINCI XI INGUINAL HERNIA REPAIR WITH MESH, RIGHT;  Surgeon: Naty Mendoza M.D.;  Location: SURGERY [facility];  Service: Gen Robotic    OTHER      tooth extractions     MEDICATIONS:  Current Outpatient Medications "   Medication Sig    omeprazole (PRILOSEC) 20 MG delayed-release capsule Take 1 Capsule by mouth every day. Indications: Heartburn    vitamin D3 (CHOLECALCIFEROL) 1000 Unit (25 mcg) Tab Take 5,000 Units by mouth every day.    gabapentin (NEURONTIN) 100 MG Cap Take 1 Capsule by mouth 3 times a day.     SOCIAL HISTORY:  Social History     Tobacco Use    Smoking status: Never    Smokeless tobacco: Never   Substance Use Topics    Alcohol use: Yes     Comment: 1-2 per month     Social History     Social History Narrative    Not on file     FAMILY HISTORY:  Family History   Problem Relation Age of Onset    No Known Problems Mother     No Known Problems Father     No Known Problems Sister     No Known Problems Brother     No Known Problems Brother      REVIEW OF SYSTEMS:  A ROS was completed.  Pertinent positives and negatives were included in the HPI, above.  All other systems were reviewed and are negative.    PHYSICAL EXAM:  General/Medical:  - NAD  - hair, skin, nails, and joints were normal    Neuro:  MENTAL STATUS: awake and alert; no deficits of speech or language; oriented to person, place, and time; affect was appropriate to situation    CRANIAL NERVES:    II: acuity: J1+/J1+, fields: intact to confrontation, pupils: 3/3 to 2/2 without a relative afferent pupillary defect, discs: sharp, no red desaturation noted    III/IV/VI: versions: intact without nystagmus    V: facial sensation: symmetric to light touch    VII: facial expression: symmetric    VIII: hearing: intact to finger rub    IX/X: palate: elevates symmetrically    XI: shoulder shrug: symmetric    XII: tongue: midline    MOTOR:  - bulk: normal throughout  - tone: normal throughout  Upper Extremity Strength  (R/L)    5/5   Elbow flexion 5/5   Elbow extension 5/5   Shoulder abduction 5/5     Lower Extremity Strength  (R/L)   Hip flexion 5/5   Knee extension 5/5   Knee flexion 5/5   Ankle plantarflexion 5/5   Ankle dorsiflexion 5/5     - can walk on toes  "and heels  - pronator drift: absent  - abnormal movements: none    SENSATION:  - light touch: grossly intact over the upper- and lower extremities  - vibration (R/L, seconds): 13/16 at the great toes  - pinprick: NT  - proprioception: NT  - Romberg: absent    COORDINATION:  - finger to nose: normal, no ataxia on exam  - finger tapping: rapid and accurate, bilaterally  - heel-to-shin: intact    REFLEXES:  Reflex Right Left   BR 2+ 2+   Biceps 2+ 2+   Triceps 2+ 2+   Patellae 2+ 2+   Achilles 2+ 2+   Toes down down     GAIT:  - narrow base and normal  - heel-raised/toe-raised gait: intact/intact  - tandem gait: intact    REVIEW OF IMAGING STUDIES:  Date: 11/15/2022  W/o and w/ contrast?: yes  Indication: \"paraesthesia of skin\"  Comparison: none  Impression:  \"1) Mild burden of periventricular and deep white matter signal abnormality which is nonspecific but unusual for patient's age concerning for demyelinating disease such as multiple sclerosis. No enhancing white matter lesions.  2) No acute intracranial process.\"    REVIEW OF LABORATORY STUDIES:  No recent data available.    REVIEW OF ELECTRODIAGNOSTIC DATA:  EMG/NCS:  Date: 11/2/2022  Impression:  \"This is a normal study.  There is no electrophysiologic evidence of a length dependent large fiber peripheral neuropathy, left cervical/lumbosacral radiculopathy, or robust evidence of a left fibular neuropathy.\"    ASSESSMENT:  Souleymane Box is a 42 y.o. man with paraesthesias and a history otherwise notable for GERD and kidneys tones.  His history is fairly nonspecific, and his physical exam findings (including sensory) were normal.  His symptoms could localize to the cervical cord, but I don't feel strongly about additional imaging at this time.  We reviewed his brain imaging, and this looks fairly unremarkable.  Souleymane will monitor his symptoms and alert the clinic if anything changes.    PLAN:  Paraesthesias:  - SPEP  - otherwise, no additional workup at " "this time  - monitor symptoms    Follow-Up:  - Return if symptoms worsen or fail to improve.    Signed: Adrian Cortez M.D.    BILLING DOCUMENTATION:   I spent 60 minutes reviewing the medical record, interviewing and examining the patient, discussing my impression (see \"assessment\" above), and coordinating care.  "

## 2023-02-25 ENCOUNTER — HOSPITAL ENCOUNTER (OUTPATIENT)
Dept: LAB | Facility: MEDICAL CENTER | Age: 43
End: 2023-02-25
Attending: PSYCHIATRY & NEUROLOGY
Payer: COMMERCIAL

## 2023-02-25 DIAGNOSIS — R20.2 PARESTHESIA OF LEFT ARM AND LEG: ICD-10-CM

## 2023-02-25 PROCEDURE — 84165 PROTEIN E-PHORESIS SERUM: CPT

## 2023-02-25 PROCEDURE — 36415 COLL VENOUS BLD VENIPUNCTURE: CPT

## 2023-02-25 PROCEDURE — 84155 ASSAY OF PROTEIN SERUM: CPT

## 2023-02-28 LAB
ALBUMIN SERPL ELPH-MCNC: 4.68 G/DL (ref 3.75–5.01)
ALPHA1 GLOB SERPL ELPH-MCNC: 0.28 G/DL (ref 0.19–0.46)
ALPHA2 GLOB SERPL ELPH-MCNC: 0.72 G/DL (ref 0.48–1.05)
B-GLOBULIN SERPL ELPH-MCNC: 0.81 G/DL (ref 0.48–1.1)
GAMMA GLOB SERPL ELPH-MCNC: 1.11 G/DL (ref 0.62–1.51)
INTERPRETATION SERPL IFE-IMP: NORMAL
MONOCLON BAND OBS SERPL: NORMAL
MONOCLONAL PROTEIN NL11656: NORMAL G/DL
PATHOLOGY STUDY: NORMAL
PROT SERPL-MCNC: 7.6 G/DL (ref 6.3–8.2)

## (undated) DEVICE — BLADE SURGICAL #15 - (50/BX 3BX/CA)

## (undated) DEVICE — SET TUBING PNEUMOCLEAR HIGH FLOW SMOKE EVACUATION (10EA/BX)

## (undated) DEVICE — DRAPE ARM  BOX OF 20

## (undated) DEVICE — SUTURE 2-0 20CM STRATAFIX SPIRAL SH NEEDLE (12/BX)

## (undated) DEVICE — GLOVE SZ 7 BIOGEL PI MICRO - PF LF (50PR/BX 4BX/CA)

## (undated) DEVICE — SUCTION INSTRUMENT YANKAUER BULBOUS TIP W/O VENT (50EA/CA)

## (undated) DEVICE — BIPOLAR FORCE DA VINCI 12X'S REISABLE

## (undated) DEVICE — SHEARS MONOPOLAR CURVED  DA VINCI 10X'S REUSABLE

## (undated) DEVICE — GLOVE BIOGEL SZ 6.5 SURGICAL PF LTX (50PR/BX 4BX/CA)

## (undated) DEVICE — CANISTER SUCTION 3000ML MECHANICAL FILTER AUTO SHUTOFF MEDI-VAC NONSTERILE LF DISP  (40EA/CA)

## (undated) DEVICE — NEEDLE DRIVER MEGA SUTURECUT DA VINCI 15X'S REUSABLE

## (undated) DEVICE — SET EXTENSION WITH 2 PORTS (48EA/CA) ***PART #2C8610 IS A SUBSTITUTE*****

## (undated) DEVICE — GLOVE BIOGEL INDICATOR SZ 7SURGICAL PF LTX - (50/BX 4BX/CA)

## (undated) DEVICE — MASK ANESTHESIA ADULT  - (100/CA)

## (undated) DEVICE — SUTURE GENERAL

## (undated) DEVICE — TUBE CONNECT SUCTION CLEAR 120 X 1/4" (50EA/CA)"

## (undated) DEVICE — LACTATED RINGERS INJ 1000 ML - (14EA/CA 60CA/PF)

## (undated) DEVICE — KIT ANESTHESIA W/CIRCUIT & 3/LT BAG W/FILTER (20EA/CA)

## (undated) DEVICE — NEPTUNE 4 PORT MANIFOLD - (20/PK)

## (undated) DEVICE — CHLORAPREP 26 ML APPLICATOR - ORANGE TINT(25/CA)

## (undated) DEVICE — DRAPE STRLE REG TOWEL 18X24 - (10/BX 4BX/CA)"

## (undated) DEVICE — PROTECTOR ULNA NERVE - (36PR/CA)

## (undated) DEVICE — COVER TIP ENDOWRIST HOT SHEAR - (10EA/BX) DA VINCI

## (undated) DEVICE — SYRINGE DISP. 12 CC LL - (100/BX)

## (undated) DEVICE — SUTURE 4-0 MONOCRYL PLUS PS-2 - 27 INCH (36/BX)

## (undated) DEVICE — SEAL 5MM-8MM UNIVERSAL  BOX OF 10

## (undated) DEVICE — HEAD HOLDER JUNIOR/ADULT

## (undated) DEVICE — TOWEL STOP TIMEOUT SAFETY FLAG (40EA/CA)

## (undated) DEVICE — Device

## (undated) DEVICE — SYSTEM CLEARIFY VISUALIZATION (10EA/PK)

## (undated) DEVICE — FORCEPS FENESTRATED BIPOLAR DA VINCI 10X'S REUSABLE

## (undated) DEVICE — DRAPE COLUMN  BOX OF 20

## (undated) DEVICE — ELECTRODE DUAL RETURN W/ CORD - (50/PK)

## (undated) DEVICE — SENSOR SPO2 NEO LNCS ADHESIVE (20/BX) SEE USER NOTES

## (undated) DEVICE — OBTURATOR BLADELESS STANDARD 8MM (6EA/BX)

## (undated) DEVICE — GLOVE SZ 6.5 BIOGEL PI MICRO - PF LF (50PR/BX)

## (undated) DEVICE — SLEEVE, VASO, THIGH, MED

## (undated) DEVICE — SET LEADWIRE 5 LEAD BEDSIDE DISPOSABLE ECG (1SET OF 5/EA)

## (undated) DEVICE — TUBING CLEARLINK DUO-VENT - C-FLO (48EA/CA)

## (undated) DEVICE — ELECTRODE 850 FOAM ADHESIVE - HYDROGEL RADIOTRNSPRNT (50/PK)

## (undated) DEVICE — GOWN WARMING STANDARD FLEX - (30/CA)

## (undated) DEVICE — ROBOTIC SURGERY SERVICES

## (undated) DEVICE — SODIUM CHL IRRIGATION 0.9% 1000ML (12EA/CA)

## (undated) DEVICE — GLOVE BIOGEL PI INDICATOR SZ 6.5 SURGICAL PF LF - (50/BX 4BX/CA)

## (undated) DEVICE — TROCAR 5X100 NON BLADED Z-TH - READ KII (6/BX)